# Patient Record
Sex: FEMALE | Race: WHITE | NOT HISPANIC OR LATINO | URBAN - METROPOLITAN AREA
[De-identification: names, ages, dates, MRNs, and addresses within clinical notes are randomized per-mention and may not be internally consistent; named-entity substitution may affect disease eponyms.]

---

## 2018-01-06 ENCOUNTER — INPATIENT (INPATIENT)
Facility: HOSPITAL | Age: 58
LOS: 18 days | Discharge: HOME | End: 2018-01-25
Attending: PSYCHIATRY & NEUROLOGY | Admitting: FAMILY MEDICINE

## 2018-01-06 VITALS
HEART RATE: 63 BPM | RESPIRATION RATE: 16 BRPM | HEIGHT: 60 IN | OXYGEN SATURATION: 100 % | WEIGHT: 104.94 LBS | TEMPERATURE: 98 F

## 2018-01-06 RX ORDER — ACETAMINOPHEN 500 MG
650 TABLET ORAL EVERY 6 HOURS
Qty: 0 | Refills: 0 | Status: DISCONTINUED | OUTPATIENT
Start: 2018-01-06 | End: 2018-01-25

## 2018-01-06 RX ORDER — RISPERIDONE 4 MG/1
2 TABLET ORAL DAILY
Qty: 0 | Refills: 0 | Status: DISCONTINUED | OUTPATIENT
Start: 2018-01-06 | End: 2018-01-09

## 2018-01-06 RX ORDER — MAGNESIUM HYDROXIDE 400 MG/1
30 TABLET, CHEWABLE ORAL DAILY
Qty: 0 | Refills: 0 | Status: DISCONTINUED | OUTPATIENT
Start: 2018-01-06 | End: 2018-01-25
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
## 2018-01-07 DIAGNOSIS — F31.9 BIPOLAR DISORDER, UNSPECIFIED: ICD-10-CM

## 2018-01-07 DIAGNOSIS — Z91.14 PATIENT'S OTHER NONCOMPLIANCE WITH MEDICATION REGIMEN: ICD-10-CM

## 2018-01-07 RX ORDER — BENZTROPINE MESYLATE 1 MG
1 TABLET ORAL
Qty: 0 | Refills: 0 | Status: DISCONTINUED | OUTPATIENT
Start: 2018-01-07 | End: 2018-01-09

## 2018-01-07 RX ADMIN — RISPERIDONE 2 MILLIGRAM(S): 4 TABLET ORAL at 08:59

## 2018-01-07 RX ADMIN — Medication 1 MILLIGRAM(S): at 21:26

## 2018-01-07 NOTE — BEHAVIORAL HEALTH ASSESSMENT NOTE - NSBHCHARTREVIEWLAB_PSY_A_CORE FT
na 89        u tox is neg  k 4.4  bun9  cr 0.56  alt 22  ast 23  tsh 2.7  wbc 6.92   hem13.6  crit 41.2

## 2018-01-07 NOTE — BEHAVIORAL HEALTH ASSESSMENT NOTE - NSBHCHARTREVIEWVS_PSY_A_CORE FT
Vital Signs Last 24 Hrs  T(C): 36.7 (07 Jan 2018 08:48), Max: 36.7 (07 Jan 2018 08:48)  T(F): 98 (07 Jan 2018 08:48), Max: 98 (07 Jan 2018 08:48)  HR: 74 (07 Jan 2018 08:48) (63 - 74)  BP: 103/73 (07 Jan 2018 08:48) (103/73 - 103/73)  BP(mean): --  RR: 12 (07 Jan 2018 08:48) (12 - 16)  SpO2: 100% (07 Jan 2018 08:48) (100% - 100%)
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
## 2018-01-07 NOTE — BEHAVIORAL HEALTH ASSESSMENT NOTE - RISK ASSESSMENT
1. chronic mental illness, psychosis  2. suicide thoughts and attempt  3. noncompliance with medication and treatment
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
## 2018-01-07 NOTE — H&P ADULT - ASSESSMENT
57F.  admitted 01/06/18.  attempted to hang herself.  nonadherent w/ psychiatric medications hx of BPD.    schizoaffective disorder, Bipolar disorder.   -suicide thoughts and attempt.  -nonadherent w/ psychiatric medication.  -c/w Psychiatric stabilization and management as per primary care team.    health maintenance  -suggested she establish w/ a community physician.  -advised follow up w/ routine age appropriate USPSTF screening recommendations and immunizations.
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
## 2018-01-07 NOTE — H&P ADULT - REASON FOR ADMISSION
Patient is a 57y old  Female who presents with a chief complaint of "I'm just trying to blend in here"

## 2018-01-07 NOTE — BEHAVIORAL HEALTH ASSESSMENT NOTE - OTHER PAST PSYCHIATRIC HISTORY (INCLUDE DETAILS REGARDING ONSET, COURSE OF ILLNESS, INPATIENT/OUTPATIENT TREATMENT)
two prior psych hospitalization at [facility] : 9/2017, 10/20/2017 .    According to the  pt has been nioncompliant with follow up since d/c from the 10/2017 hospitalization two prior psych hospitalization at [facility] : 9/2017, 10/20/2017 .    According to the  pt has been non-compliant with follow up since d/c from the 10/2017 hospitalization according to the  but pt denies this and claims follow up with Dr Ramiro Schumacher and Dr Aguero  Pt not willing to indicate reason for the hospitalization "It's nothing why , what does that have to do with now?"

## 2018-01-07 NOTE — BEHAVIORAL HEALTH ASSESSMENT NOTE - NSBHADMITIPSTRENGTH_PSY_A_CORE
Cooperative with treatment/Has supportive interpersonal relationships with family, friends or peers/Has access to housing/residential stability

## 2018-01-07 NOTE — H&P ADULT - NSHPPHYSICALEXAM_GEN_ALL_CORE
Vital Signs Last 24 Hrs  T(C): 36.7 (07 Jan 2018 08:48), Max: 36.7 (07 Jan 2018 08:48)  T(F): 98 (07 Jan 2018 08:48), Max: 98 (07 Jan 2018 08:48)  HR: 74 (07 Jan 2018 08:48) (63 - 74)  BP: 103/73 (07 Jan 2018 08:48) (103/73 - 103/73)  BP(mean): --  RR: 12 (07 Jan 2018 08:48) (12 - 16)  SpO2: 100% (07 Jan 2018 08:48) (100% - 100%)    PHYSICAL EXAM:  Constitutional: NAD, awake and alert, well-developed with good responses to questions, mentally intact.   HEENT: PERRL, EOMI, MMM.  Neck: Soft and supple, No carotid bruit, No JVD  Respiratory: Breath sounds are clear bilaterally, No wheezing, rales or rhonchi  Cardiovascular: S1 and S2, regular rate and rhythm, no murmur, rub or gallop.  Gastrointestinal: Bowel Sounds present, soft, nontender, nondistended, no guarding, no rebound, no mass.  Extremities: No peripheral edema  Vascular: 2+ peripheral pulses  Neurological: A/O x 3, no focal deficits  Musculoskeletal: 5/5 strength b/l upper and lower extremities  Skin:  no visible rashes.

## 2018-01-08 RX ADMIN — Medication 1 MILLIGRAM(S): at 21:19

## 2018-01-08 RX ADMIN — Medication 1 MILLIGRAM(S): at 08:53

## 2018-01-08 RX ADMIN — RISPERIDONE 2 MILLIGRAM(S): 4 TABLET ORAL at 08:53

## 2018-01-08 NOTE — PROGRESS NOTE BEHAVIORAL HEALTH - NSBHFUPINTERVALHXFT_PSY_A_CORE
Left call back for the therapist and psychiatrist  Pt allowing contact . Left call back for the therapist and psychiatrist  Pt allowing contact . Pt with continued lack of insight Pt remains superficial ly cooperative but still maintains that she is here only because her  is not well and made up a story for her to be here.

## 2018-01-09 RX ORDER — RISPERIDONE 4 MG/1
3 TABLET ORAL DAILY
Qty: 0 | Refills: 0 | Status: DISCONTINUED | OUTPATIENT
Start: 2018-01-10 | End: 2018-01-18

## 2018-01-09 RX ORDER — RISPERIDONE 4 MG/1
1 TABLET ORAL DAILY
Qty: 0 | Refills: 0 | Status: DISCONTINUED | OUTPATIENT
Start: 2018-01-09 | End: 2018-01-09

## 2018-01-09 RX ORDER — RISPERIDONE 4 MG/1
1 TABLET ORAL ONCE
Qty: 0 | Refills: 0 | Status: COMPLETED | OUTPATIENT
Start: 2018-01-09 | End: 2018-01-09

## 2018-01-09 RX ORDER — BENZTROPINE MESYLATE 1 MG
2 TABLET ORAL
Qty: 0 | Refills: 0 | Status: DISCONTINUED | OUTPATIENT
Start: 2018-01-09 | End: 2018-01-11

## 2018-01-09 RX ADMIN — RISPERIDONE 2 MILLIGRAM(S): 4 TABLET ORAL at 09:17

## 2018-01-09 RX ADMIN — RISPERIDONE 1 MILLIGRAM(S): 4 TABLET ORAL at 10:51

## 2018-01-09 RX ADMIN — Medication 2 MILLIGRAM(S): at 21:06

## 2018-01-09 RX ADMIN — Medication 1 MILLIGRAM(S): at 09:17

## 2018-01-09 NOTE — PROGRESS NOTE BEHAVIORAL HEALTH - NSBHFUPINTERVALHXFT_PSY_A_CORE
Pt continuing with loosening of association and tangential speech Pt is restless and with no insight.  Still she is insisting that her  made up the story of her attempting to hang herself.  Pt with impaired reasoning and is not aware that some of her own thoughts are illogical.

## 2018-01-10 RX ADMIN — Medication 2 MILLIGRAM(S): at 20:44

## 2018-01-10 RX ADMIN — Medication 2 MILLIGRAM(S): at 08:54

## 2018-01-10 RX ADMIN — RISPERIDONE 3 MILLIGRAM(S): 4 TABLET ORAL at 08:54
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
## 2018-01-10 NOTE — PROGRESS NOTE ADULT - ASSESSMENT
Pt with thick nails. States seen DPM had culture "No Fungus" generalized xerosis the soles and fissure to post R heel. Advise Lac Hydrin 12 % Q day, prn feet & Toe nails. Should F/U with PDPM as OP THANKS

## 2018-01-10 NOTE — DIETITIAN INITIAL EVALUATION ADULT. - OTHER INFO
Consult for assessment and education. Pt denies chewing and swallowing difficulty. Pt denies n/v/d/c. Pt reports eating well, likes vegetables and focused on healthy foods. Pt states she dose not like to eat meat and enjoys vegetarian dishes. Pt states she runs half marathons and enjoys dance. Pt seen on bike and at first did not want to talk to RD. Recommend monitoring pt's weight to assess for weight loss. Pt seemed concerned about gaining weight and may have unhealthy obsession with weight and exercise (will monitor).

## 2018-01-11 RX ORDER — BENZTROPINE MESYLATE 1 MG
1 TABLET ORAL THREE TIMES A DAY
Qty: 0 | Refills: 0 | Status: DISCONTINUED | OUTPATIENT
Start: 2018-01-11 | End: 2018-01-25

## 2018-01-11 RX ADMIN — Medication 1 MILLIGRAM(S): at 14:05

## 2018-01-11 RX ADMIN — Medication 2 MILLIGRAM(S): at 09:20

## 2018-01-11 RX ADMIN — RISPERIDONE 3 MILLIGRAM(S): 4 TABLET ORAL at 09:20

## 2018-01-11 NOTE — PROGRESS NOTE BEHAVIORAL HEALTH - NSBHFUPINTERVALHXFT_PSY_A_CORE
Pt continues with wanting to remain in her room.  Pt with continued paranoid delusions Pt with compliance with medication but is also with the cc of getting sedated.Pt remains preoccupied.

## 2018-01-11 NOTE — PROGRESS NOTE BEHAVIORAL HEALTH - NSBHCHARTREVIEWINVESTIGATE_PSY_A_CORE FT
QRS axis to [] ° and NSR at a rate of [] BPM. There was no atrial enlargement. There was no ventricular hypertrophy. There were no ST-T changes and all intervals were normal.
QRS axis to [] ° and NSR at a rate of [] BPM. There was no atrial enlargement. There was no ventricular hypertrophy. There were no ST-T changes and all intervals were normal.

## 2018-01-11 NOTE — PROGRESS NOTE BEHAVIORAL HEALTH - NSBHFUPINTERVALHXFT_PSY_A_CORE
Will decrease the cogentin as the pt with some side effect s and with no cc of any muscle tightness or any signs of EPS.      Pt still with the thought that her  gave a false history of her being suicidal .  However the pt is not asking to leave the  hospital at this time.  Pt with less tangential speech , still inclusive.  She denies any suicidal ideation or plan

## 2018-01-12 RX ADMIN — Medication 1 MILLIGRAM(S): at 09:10

## 2018-01-12 RX ADMIN — Medication 1 MILLIGRAM(S): at 21:02

## 2018-01-12 RX ADMIN — Medication 1 MILLIGRAM(S): at 12:43

## 2018-01-12 RX ADMIN — RISPERIDONE 3 MILLIGRAM(S): 4 TABLET ORAL at 09:11

## 2018-01-12 NOTE — CONSULT NOTE ADULT - ASSESSMENT
*Schizoaffective disorder/ Bipolar disorder.   -Management as per Psych  -suicide thoughts and attempt.  -nonadherent w/ psychiatric medication.  -c/w Psychiatric stabilization and management as per primary care team. *Schizoaffective disorder/ Bipolar disorder.   -Management as per Psych  -suicide thoughts and attempt.  -nonadherent w/ psychiatric medication.  -c/w Psychiatric stabilization and management as per primary care team.    *Ingrown Hair/Mild Folliculitis of right groin  -Does not look infected  -Warm compressors  -Keep area clean  -Avoid Hot tubs and Shaving the Area  -monitor for Fevers and worsening redness    Thank you for this courtesy consult and Reconsult if needed. signing off case.

## 2018-01-12 NOTE — PROGRESS NOTE BEHAVIORAL HEALTH - NSBHFUPINTERVALHXFT_PSY_A_CORE
Pt with goods eye contact Alert Pt with denial of any suicide ideation  Spoke with the  who will be visiting the pt this weekend.  Pt with decreased paranoid thoughts and is more interactive with peers
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
## 2018-01-13 RX ADMIN — Medication 1 MILLIGRAM(S): at 21:04

## 2018-01-13 RX ADMIN — Medication 1 MILLIGRAM(S): at 12:47

## 2018-01-13 RX ADMIN — Medication 1 MILLIGRAM(S): at 09:06

## 2018-01-13 RX ADMIN — RISPERIDONE 3 MILLIGRAM(S): 4 TABLET ORAL at 09:06

## 2018-01-14 RX ADMIN — RISPERIDONE 3 MILLIGRAM(S): 4 TABLET ORAL at 09:05

## 2018-01-14 RX ADMIN — Medication 1 MILLIGRAM(S): at 20:46

## 2018-01-14 RX ADMIN — Medication 1 MILLIGRAM(S): at 13:12

## 2018-01-14 RX ADMIN — MAGNESIUM HYDROXIDE 30 MILLILITER(S): 400 TABLET, CHEWABLE ORAL at 19:13

## 2018-01-14 RX ADMIN — Medication 1 MILLIGRAM(S): at 09:05

## 2018-01-15 RX ADMIN — Medication 1 MILLIGRAM(S): at 13:00

## 2018-01-15 RX ADMIN — Medication 1 MILLIGRAM(S): at 20:23

## 2018-01-15 RX ADMIN — Medication 1 MILLIGRAM(S): at 09:20

## 2018-01-15 RX ADMIN — MAGNESIUM HYDROXIDE 30 MILLILITER(S): 400 TABLET, CHEWABLE ORAL at 19:40

## 2018-01-15 RX ADMIN — RISPERIDONE 3 MILLIGRAM(S): 4 TABLET ORAL at 09:20

## 2018-01-16 RX ADMIN — Medication 1 MILLIGRAM(S): at 21:09

## 2018-01-16 RX ADMIN — MAGNESIUM HYDROXIDE 30 MILLILITER(S): 400 TABLET, CHEWABLE ORAL at 21:09

## 2018-01-16 RX ADMIN — RISPERIDONE 3 MILLIGRAM(S): 4 TABLET ORAL at 09:30

## 2018-01-16 RX ADMIN — Medication 1 MILLIGRAM(S): at 12:39

## 2018-01-16 RX ADMIN — Medication 1 MILLIGRAM(S): at 09:30

## 2018-01-16 NOTE — PROGRESS NOTE BEHAVIORAL HEALTH - NSBHFUPINTERVALHXFT_PSY_A_CORE
Pt with less pressured speech, still with some suspicious guardedness, and remains superficially cooperative. Pt often will verbalize that she has a mental disorder but usually will say this as she believes this is the expected response. She remains in behavioral control. Spoke with the pt who claim

## 2018-01-16 NOTE — PROGRESS NOTE BEHAVIORAL HEALTH - NSBHFUPINTERVALHXFT_PSY_A_CORE
Pt still maintaining that the  is lying about some of the history. She still claims that she has no compliance problem especially on the outside yet her brother is aware of this and so is the  and the pt came in with no active medication being taken and was fully manic with pressured speech, grandiose and paranoid delusions and poor sleep.  Spoke with the pt about injectable medication and she indicated that she had been on the risperdal consta in the past.  Pt only willing to be on 25mg IM of the consta,  Spoke with the CSW will need family meeting , mostly likely with the pt,  and the father.  Pt continued with the denial of any suicidal ideation or plan

## 2018-01-16 NOTE — PROGRESS NOTE BEHAVIORAL HEALTH - PROBLEM SELECTOR PLAN 1
risperdal 2mg po daily
risperdal for mood and affect stability

## 2018-01-16 NOTE — PROGRESS NOTE BEHAVIORAL HEALTH - PROBLEM SELECTOR PROBLEM 2
Noncompliance with medication regimen
Suicide attempt by self-inflicted strangulation or suffocation

## 2018-01-17 RX ORDER — RISPERIDONE 4 MG/1
25 TABLET ORAL EVERY 2 WEEKS
Qty: 0 | Refills: 0 | Status: DISCONTINUED | OUTPATIENT
Start: 2018-01-17 | End: 2018-01-18

## 2018-01-17 RX ADMIN — RISPERIDONE 25 MILLIGRAM(S): 4 TABLET ORAL at 21:26

## 2018-01-17 RX ADMIN — Medication 1 MILLIGRAM(S): at 21:02

## 2018-01-17 RX ADMIN — Medication 1 MILLIGRAM(S): at 12:28

## 2018-01-17 RX ADMIN — Medication 1 MILLIGRAM(S): at 09:25

## 2018-01-17 RX ADMIN — RISPERIDONE 3 MILLIGRAM(S): 4 TABLET ORAL at 09:25

## 2018-01-17 RX ADMIN — MAGNESIUM HYDROXIDE 30 MILLILITER(S): 400 TABLET, CHEWABLE ORAL at 21:02

## 2018-01-17 NOTE — PROGRESS NOTE BEHAVIORAL HEALTH - NSBHFUPINTERVALHXFT_PSY_A_CORE
Pt denies any suicidal ideation or plan Pt with denial of any side effects from medication.  She has a history of noncompliance with medication and with that would have increased manic symptoms of pressured speech, paranoid and manic delusions.  Currently the pt is still claiming that the  has been lying about her and is tell other people that she is "the ill person "    Will be needing a family meeting.

## 2018-01-18 RX ORDER — RISPERIDONE 4 MG/1
37.5 TABLET ORAL
Qty: 0 | Refills: 0 | Status: DISCONTINUED | OUTPATIENT
Start: 2018-01-18 | End: 2018-01-19

## 2018-01-18 RX ORDER — RISPERIDONE 4 MG/1
4 TABLET ORAL DAILY
Qty: 0 | Refills: 0 | Status: DISCONTINUED | OUTPATIENT
Start: 2018-01-18 | End: 2018-01-25

## 2018-01-18 RX ADMIN — Medication 1 MILLIGRAM(S): at 13:05

## 2018-01-18 RX ADMIN — Medication 1 MILLIGRAM(S): at 20:31

## 2018-01-18 RX ADMIN — RISPERIDONE 3 MILLIGRAM(S): 4 TABLET ORAL at 09:01

## 2018-01-18 RX ADMIN — MAGNESIUM HYDROXIDE 30 MILLILITER(S): 400 TABLET, CHEWABLE ORAL at 20:41

## 2018-01-18 RX ADMIN — Medication 1 MILLIGRAM(S): at 09:01

## 2018-01-18 NOTE — PROGRESS NOTE BEHAVIORAL HEALTH - NSBHFUPINTERVALHXFT_PSY_A_CORE
Although the pt is less pressured in speech and is with less grandiose delusion, she is still with intense labile inappropriate affect.  Still with paranoid delusions and is accusatory  . Will readjust the risperdal as this is the only medication that she is allowing at this time

## 2018-01-19 RX ORDER — LITHIUM CARBONATE 300 MG/1
600 TABLET, EXTENDED RELEASE ORAL AT BEDTIME
Qty: 0 | Refills: 0 | Status: DISCONTINUED | OUTPATIENT
Start: 2018-01-19 | End: 2018-01-22

## 2018-01-19 RX ADMIN — Medication 1 MILLIGRAM(S): at 20:27

## 2018-01-19 RX ADMIN — Medication 1 MILLIGRAM(S): at 08:59

## 2018-01-19 RX ADMIN — MAGNESIUM HYDROXIDE 30 MILLILITER(S): 400 TABLET, CHEWABLE ORAL at 20:28

## 2018-01-19 RX ADMIN — Medication 1 MILLIGRAM(S): at 13:41

## 2018-01-19 RX ADMIN — RISPERIDONE 4 MILLIGRAM(S): 4 TABLET ORAL at 08:59

## 2018-01-19 NOTE — PROGRESS NOTE BEHAVIORAL HEALTH - NSBHADMITCOUNSEL_PSY_A_CORE
risk factor reduction/client/family/caregiver education/risks and benefits of treatment options
client/family/caregiver education/risks and benefits of treatment options/risk factor reduction

## 2018-01-20 LAB
ALBUMIN SERPL ELPH-MCNC: 4 G/DL — SIGNIFICANT CHANGE UP (ref 3.3–5)
ALP SERPL-CCNC: 110 U/L — SIGNIFICANT CHANGE UP (ref 40–120)
ALT FLD-CCNC: 22 U/L — SIGNIFICANT CHANGE UP (ref 12–78)
ANION GAP SERPL CALC-SCNC: 6 MMOL/L — SIGNIFICANT CHANGE UP (ref 5–17)
AST SERPL-CCNC: 12 U/L — LOW (ref 15–37)
BILIRUB SERPL-MCNC: 0.6 MG/DL — SIGNIFICANT CHANGE UP (ref 0.2–1.2)
BUN SERPL-MCNC: 7 MG/DL — SIGNIFICANT CHANGE UP (ref 7–23)
CALCIUM SERPL-MCNC: 9 MG/DL — SIGNIFICANT CHANGE UP (ref 8.5–10.1)
CHLORIDE SERPL-SCNC: 100 MMOL/L — SIGNIFICANT CHANGE UP (ref 96–108)
CO2 SERPL-SCNC: 30 MMOL/L — SIGNIFICANT CHANGE UP (ref 22–31)
CREAT SERPL-MCNC: 0.62 MG/DL — SIGNIFICANT CHANGE UP (ref 0.5–1.3)
GLUCOSE SERPL-MCNC: 93 MG/DL — SIGNIFICANT CHANGE UP (ref 70–99)
HCT VFR BLD CALC: 45.2 % — HIGH (ref 34.5–45)
HGB BLD-MCNC: 14.7 G/DL — SIGNIFICANT CHANGE UP (ref 11.5–15.5)
MCHC RBC-ENTMCNC: 31 PG — SIGNIFICANT CHANGE UP (ref 27–34)
MCHC RBC-ENTMCNC: 32.6 GM/DL — SIGNIFICANT CHANGE UP (ref 32–36)
MCV RBC AUTO: 95.1 FL — SIGNIFICANT CHANGE UP (ref 80–100)
PLATELET # BLD AUTO: 271 K/UL — SIGNIFICANT CHANGE UP (ref 150–400)
POTASSIUM SERPL-MCNC: 3.8 MMOL/L — SIGNIFICANT CHANGE UP (ref 3.5–5.3)
POTASSIUM SERPL-SCNC: 3.8 MMOL/L — SIGNIFICANT CHANGE UP (ref 3.5–5.3)
PROT SERPL-MCNC: 7.4 GM/DL — SIGNIFICANT CHANGE UP (ref 6–8.3)
RBC # BLD: 4.75 M/UL — SIGNIFICANT CHANGE UP (ref 3.8–5.2)
RBC # FLD: 10.9 % — SIGNIFICANT CHANGE UP (ref 10.3–14.5)
SODIUM SERPL-SCNC: 136 MMOL/L — SIGNIFICANT CHANGE UP (ref 135–145)
TSH SERPL-MCNC: 1.35 UU/ML — SIGNIFICANT CHANGE UP (ref 0.36–3.74)
WBC # BLD: 4.5 K/UL — SIGNIFICANT CHANGE UP (ref 3.8–10.5)
WBC # FLD AUTO: 4.5 K/UL — SIGNIFICANT CHANGE UP (ref 3.8–10.5)

## 2018-01-20 RX ADMIN — Medication 1 MILLIGRAM(S): at 12:03

## 2018-01-20 RX ADMIN — Medication 1 MILLIGRAM(S): at 08:55

## 2018-01-20 RX ADMIN — Medication 1 MILLIGRAM(S): at 21:00

## 2018-01-20 RX ADMIN — RISPERIDONE 4 MILLIGRAM(S): 4 TABLET ORAL at 08:55

## 2018-01-21 RX ADMIN — Medication 1 MILLIGRAM(S): at 13:09

## 2018-01-21 RX ADMIN — Medication 1 MILLIGRAM(S): at 21:04

## 2018-01-21 RX ADMIN — MAGNESIUM HYDROXIDE 30 MILLILITER(S): 400 TABLET, CHEWABLE ORAL at 21:06

## 2018-01-21 RX ADMIN — Medication 1 MILLIGRAM(S): at 08:59

## 2018-01-21 RX ADMIN — RISPERIDONE 4 MILLIGRAM(S): 4 TABLET ORAL at 08:59

## 2018-01-22 RX ORDER — RISPERIDONE 4 MG/1
1 TABLET ORAL AT BEDTIME
Qty: 0 | Refills: 0 | Status: DISCONTINUED | OUTPATIENT
Start: 2018-01-22 | End: 2018-01-25

## 2018-01-22 RX ADMIN — RISPERIDONE 1 MILLIGRAM(S): 4 TABLET ORAL at 21:13

## 2018-01-22 RX ADMIN — RISPERIDONE 4 MILLIGRAM(S): 4 TABLET ORAL at 08:58

## 2018-01-22 RX ADMIN — Medication 1 MILLIGRAM(S): at 08:58

## 2018-01-22 RX ADMIN — Medication 1 MILLIGRAM(S): at 21:11

## 2018-01-22 RX ADMIN — Medication 1 MILLIGRAM(S): at 13:49

## 2018-01-22 RX ADMIN — MAGNESIUM HYDROXIDE 30 MILLILITER(S): 400 TABLET, CHEWABLE ORAL at 22:13

## 2018-01-22 NOTE — PROGRESS NOTE BEHAVIORAL HEALTH - NSBHFUPINTERVALHXFT_PSY_A_CORE
Pt with continued hypomania with pressured speech, tangential and loosening of association.  Pt with refusal of lithium, tegretal  topamax, trileptal .  Pt claimed"I am doing fine. " Pt with little insight and now claims "its a mistake to have all those people in one room.  Pt claiming that she felt overwhelmed by all those  people.

## 2018-01-23 RX ADMIN — Medication 1 MILLIGRAM(S): at 08:59

## 2018-01-23 RX ADMIN — Medication 1 MILLIGRAM(S): at 13:04

## 2018-01-23 RX ADMIN — RISPERIDONE 4 MILLIGRAM(S): 4 TABLET ORAL at 08:59

## 2018-01-23 RX ADMIN — RISPERIDONE 1 MILLIGRAM(S): 4 TABLET ORAL at 20:26

## 2018-01-23 RX ADMIN — Medication 1 MILLIGRAM(S): at 20:26

## 2018-01-23 RX ADMIN — MAGNESIUM HYDROXIDE 30 MILLILITER(S): 400 TABLET, CHEWABLE ORAL at 20:30

## 2018-01-23 NOTE — PROGRESS NOTE BEHAVIORAL HEALTH - NSBHFUPINTERVALHXFT_PSY_A_CORE
Pt explained away her hypomania and pressured speech and restlessness  as " I'm a happy go kathy what can I say."   Pt with limited insight, and was busy "power walking " in the hallway with a broad smile. Pt also minimized her exaggerated response to her family pointing out her symptoms as " I didn't have good enough sleep the night before, and there were too many of them (family members ) and only one of me."  Pt had an illogical explanation to every attempt to give her feedback  about symptoms and her behavior. Pt externalizing all issues to other people "failing to do their jobs, not telling me what to expect and taking advantage of me "    Pt is consistently denying any suicidal ideation or plan and spoke of her future plans to " getting back to my psychotherapy and be with my real true friends."

## 2018-01-24 RX ADMIN — Medication 1 MILLIGRAM(S): at 12:54

## 2018-01-24 RX ADMIN — RISPERIDONE 4 MILLIGRAM(S): 4 TABLET ORAL at 08:49

## 2018-01-24 RX ADMIN — RISPERIDONE 1 MILLIGRAM(S): 4 TABLET ORAL at 20:50

## 2018-01-24 RX ADMIN — Medication 1 MILLIGRAM(S): at 08:49

## 2018-01-24 RX ADMIN — Medication 1 MILLIGRAM(S): at 20:50

## 2018-01-24 NOTE — PROGRESS NOTE BEHAVIORAL HEALTH - AFFECT QUALITY
Anxious/Elevated
Anxious/Elevated
Elevated/Anxious
Anxious/Elevated
Elevated/Anxious
Anxious/Elevated
Elevated/Anxious

## 2018-01-24 NOTE — PROGRESS NOTE BEHAVIORAL HEALTH - SECONDARY DX1
Suicide attempt by self-inflicted strangulation or suffocation
Noncompliance with medication regimen
Suicide attempt by self-inflicted strangulation or suffocation
Noncompliance with medication regimen
Suicide attempt by self-inflicted strangulation or suffocation
Noncompliance with medication regimen
Noncompliance with medication regimen

## 2018-01-24 NOTE — PROGRESS NOTE BEHAVIORAL HEALTH - LANGUAGE
No abnormalities noted

## 2018-01-24 NOTE — PROGRESS NOTE BEHAVIORAL HEALTH - ABNORMAL MOVEMENTS
No abnormal movements

## 2018-01-24 NOTE — PROGRESS NOTE BEHAVIORAL HEALTH - NSBHFUPINTERVALHXFT_PSY_A_CORE
Pt with little insight or motivation. Pt remains in behavorial control but  still has pressuring of speech and thought flooding. Pt continue to dismiss any attempt to gain insight .  Spoke with her concerning the risperdal consta but the pt is ambivalent about getting an increase in the dose. She continues to be resistive to getting a mood stabilizer "no I don't need to be controlled."

## 2018-01-24 NOTE — PROGRESS NOTE BEHAVIORAL HEALTH - IMPULSE CONTROL
Impaired

## 2018-01-24 NOTE — PROGRESS NOTE BEHAVIORAL HEALTH - MOOD
Anxious

## 2018-01-24 NOTE — PROGRESS NOTE BEHAVIORAL HEALTH - NSBHFUPVIOL_PSY_A_CORE
none known
yes
none known

## 2018-01-24 NOTE — PROGRESS NOTE BEHAVIORAL HEALTH - THOUGHT PROCESS
Impaired reasoning/Overinclusive/Circumstantial/Illogical
Illogical/Impaired reasoning/Overinclusive/Circumstantial
Overinclusive/Circumstantial/Illogical/Impaired reasoning
Overinclusive/Circumstantial/Tangential/Flight of ideas/Illogical/Impaired reasoning
Overinclusive/Circumstantial/Illogical/Impaired reasoning
Flight of ideas/Illogical/Impaired reasoning/Overinclusive/Tangential/Circumstantial
Illogical/Impaired reasoning/Overinclusive/Circumstantial
Impaired reasoning/Overinclusive/Circumstantial/Illogical
Overinclusive/Circumstantial/Illogical/Impaired reasoning
Overinclusive/Illogical/Impaired reasoning/Circumstantial
Circumstantial/Overinclusive/Illogical/Impaired reasoning
Overinclusive/Impaired reasoning/Circumstantial/Tangential/Flight of ideas/Illogical

## 2018-01-24 NOTE — PROGRESS NOTE BEHAVIORAL HEALTH - NSBHADMITIPREASON_PSY_A_CORE
Danger to self; mental illness expected to respond to inpatient care

## 2018-01-24 NOTE — PROGRESS NOTE BEHAVIORAL HEALTH - MUSCLE TONE / STRENGTH
Normal muscle tone/strength

## 2018-01-24 NOTE — PROGRESS NOTE BEHAVIORAL HEALTH - NSBHADMITDANGERSELF_PSY_A_CORE
suicidal ideation with plan and means
unable to care for self
suicidal ideation with plan and means
suicidal ideation with plan and means
unable to care for self
suicidal ideation with plan and means
unable to care for self

## 2018-01-24 NOTE — PROGRESS NOTE BEHAVIORAL HEALTH - NSBHADMITMEDEDUDETAILS_A_CORE FT
pt is aware of the use of medication and of the potential side effects
pt is aware of the use of medication and of the potential side effect
pt is aware of the use of medication and of the potential side effects
pt is aware of the use of medication and of the potential side effects
pt aware of the use of medications
pt is aware of the use of medication and of the potential side effects
pt aware of the use of medications
pt is aware of the use of medication and of the potential side effects
pt is aware of the use of medication and of the potential side effect
pt is aware of the use of medication and of the potential side effect
pt aware of the use of medications

## 2018-01-24 NOTE — PROGRESS NOTE BEHAVIORAL HEALTH - BODY HABITUS
Well nourished

## 2018-01-24 NOTE — PROGRESS NOTE BEHAVIORAL HEALTH - NSBHCHARTREVIEWVS_PSY_A_CORE FT
Vital Signs Last 24 Hrs  T(C): 36.9 (12 Jan 2018 08:59), Max: 36.9 (12 Jan 2018 08:59)  T(F): 98.5 (12 Jan 2018 08:59), Max: 98.5 (12 Jan 2018 08:59)  HR: 71 (12 Jan 2018 08:59) (71 - 71)  BP: 98/55 (12 Jan 2018 08:59) (98/55 - 98/55)  BP(mean): --  RR: 12 (12 Jan 2018 08:59) (12 - 12)  SpO2: 99% (12 Jan 2018 08:59) (99% - 99%)
Vital Signs Last 24 Hrs  T(C): 36.7 (07 Jan 2018 08:48), Max: 36.7 (07 Jan 2018 08:48)  T(F): 98 (07 Jan 2018 08:48), Max: 98 (07 Jan 2018 08:48)  HR: 74 (07 Jan 2018 08:48) (63 - 74)  BP: 103/73 (07 Jan 2018 08:48) (103/73 - 103/73)  BP(mean): --  RR: 12 (07 Jan 2018 08:48) (12 - 16)  SpO2: 100% (07 Jan 2018 08:48) (100% - 100%)
Vital Signs Last 24 Hrs  T(C): 36.6 (11 Jan 2018 08:07), Max: 36.6 (11 Jan 2018 08:07)  T(F): 97.9 (11 Jan 2018 08:07), Max: 97.9 (11 Jan 2018 08:07)  HR: 64 (11 Jan 2018 08:07) (64 - 64)  BP: 96/59 (11 Jan 2018 08:07) (96/59 - 96/59)  BP(mean): --  RR: 16 (11 Jan 2018 08:07) (16 - 16)  SpO2: 100% (11 Jan 2018 08:07) (100% - 100%)
Vital Signs Last 24 Hrs  T(C): 36.9 (23 Jan 2018 08:25), Max: 36.9 (23 Jan 2018 08:25)  T(F): 98.5 (23 Jan 2018 08:25), Max: 98.5 (23 Jan 2018 08:25)  HR: 62 (23 Jan 2018 08:25) (62 - 62)  BP: 112/65 (23 Jan 2018 08:25) (112/65 - 112/65)  BP(mean): --  RR: 14 (23 Jan 2018 08:25) (14 - 14)  SpO2: 98% (23 Jan 2018 08:25) (98% - 98%)
Vital Signs Last 24 Hrs  T(C): 36.9 (12 Jan 2018 08:59), Max: 36.9 (12 Jan 2018 08:59)  T(F): 98.5 (12 Jan 2018 08:59), Max: 98.5 (12 Jan 2018 08:59)  HR: 71 (12 Jan 2018 08:59) (71 - 71)  BP: 98/55 (12 Jan 2018 08:59) (98/55 - 98/55)  BP(mean): --  RR: 12 (12 Jan 2018 08:59) (12 - 12)  SpO2: 99% (12 Jan 2018 08:59) (99% - 99%)
Vital Signs Last 24 Hrs  T(C): 36.9 (23 Jan 2018 08:25), Max: 36.9 (23 Jan 2018 08:25)  T(F): 98.5 (23 Jan 2018 08:25), Max: 98.5 (23 Jan 2018 08:25)  HR: 62 (23 Jan 2018 08:25) (62 - 62)  BP: 112/65 (23 Jan 2018 08:25) (112/65 - 112/65)  BP(mean): --  RR: 14 (23 Jan 2018 08:25) (14 - 14)  SpO2: 98% (23 Jan 2018 08:25) (98% - 98%)
Vital Signs Last 24 Hrs  T(C): 36.7 (07 Jan 2018 08:48), Max: 36.7 (07 Jan 2018 08:48)  T(F): 98 (07 Jan 2018 08:48), Max: 98 (07 Jan 2018 08:48)  HR: 74 (07 Jan 2018 08:48) (63 - 74)  BP: 103/73 (07 Jan 2018 08:48) (103/73 - 103/73)  BP(mean): --  RR: 12 (07 Jan 2018 08:48) (12 - 16)  SpO2: 100% (07 Jan 2018 08:48) (100% - 100%)
Vital Signs Last 24 Hrs  T(C): 36.7 (07 Jan 2018 08:48), Max: 36.7 (07 Jan 2018 08:48)  T(F): 98 (07 Jan 2018 08:48), Max: 98 (07 Jan 2018 08:48)  HR: 74 (07 Jan 2018 08:48) (63 - 74)  BP: 103/73 (07 Jan 2018 08:48) (103/73 - 103/73)  BP(mean): --  RR: 12 (07 Jan 2018 08:48) (12 - 16)  SpO2: 100% (07 Jan 2018 08:48) (100% - 100%)

## 2018-01-24 NOTE — PROGRESS NOTE BEHAVIORAL HEALTH - NSBHPTASSESSDT_PSY_A_CORE
08-Jan-2018 15:57
08-Jan-2018 16:43
10-Prateek-2018 19:32
11-Jan-2018 12:55
17-Jan-2018 17:31
18-Jan-2018 17:38
22-Jan-2018 16:34
24-Jan-2018 10:07
16-Jan-2018 15:42
23-Jan-2018 15:08
16-Jan-2018 15:17
09-Jan-2018 15:18
12-Jan-2018 19:45
19-Jan-2018 18:46

## 2018-01-24 NOTE — PROGRESS NOTE BEHAVIORAL HEALTH - SUMMARY
57 yr old female with hx of two prior psych admissions to [facility] .  Pt with hx of dxn of bipolar disorder. Hx of noncompliance with medication or treatment .  Pt was reported to have attempted to fashion a noose with the intent of hanging herself , and was also attempting to "drag her  outside in the middle of the blizzard."  Pt was noted to have thought disorder, preoccupation with own thoughts , impaired reasoning and impaired judgement.
57 yr old female with hx of two prior psych admissions to [facility] .  Pt with hx of dxn of bipolar disorder. Hx of noncompliance with medication or treatment .  Pt was reported to have attempted to fashion a noose with the intent of hanging herself , and was also attempting to "drag her  outside in the middle of the blizzard."  Pt was noted to have thought disorder, preoccupation with own thoughts , impaired reasoning and impaired judgement.
57 yr old female with hx of two prior psych admissions to [facility] .  Pt with hx of dxn of bipolar disorder. Hx of noncompliance with medication or treatment .  Pt was reported to have attempted to fashion a noose with the intent of hanging herself , and was also attempting to "drag her  outside in the middle of the blizzard."  Pt was noted to have thought disorder, preoccupation with own thoughts , impaired reasoning and impaired judgement.
57 yr old female with hx of two prior psych admissions to [facility] .  Pt with hx of dxn of bipolar disorder. Hx of noncompliance with medication or treatment .  Pt was reported to have attempted to fashion a noose with the intent of hanging herself , and was also attempting to "drag her  outside in the middle of the blizzard."  Pt was noted to have thought disorder, preoccupation with own thoughts , impaired reasoning and impaired judgement.
57 yr old female with hx of two prior psych admissions to [facility] .  Pt with hx of dxn of bipolar disorder. Hx of noncompliance with medication or treatment .  Pt was reported to have attempted to fashion a noose with the intent of hanging herself , and was also attempting to "drag her  outside in the middle of the blizzard."  Pt was noted to have thought disorder, preoccupation with own thoughts , impaired reasoning and impaired judgement.
57 yr old female with hx of two prior psych admissions to [facility] .  Pt with hx of dxn of bipolar disorder. Hx of noncompliance with medication or treatment .  Pt was reported to have attempted to fashion a noose with the intent of hanging herself , and was also attempting to "drag her  outside in the middle of the blizzard."  Pt was noted to have thought disorder, preoccupation with own thoughts , impaired reasoning and impaired judgement.
57 yr old female with hx of two prior psych admissions to [facility] .  Pt with hx of dxn of bipolar disorder. Hx of noncompliance with medication or treatment .  Pt was reported to have attempted to fashion a noose with the intent of hanging herself , and was also attempting to "drag her  outside in the middle of the blizzard."  Pt was noted to have thought disorder, preoccupation with own thoughts , impaired reasoning and impaired judgement.
57 yr old female with hx of two prior psych admissions to [facility] .  Pt with hx of dxn of bipolar disorder. Hx of noncompliance with medication or treatment .  Pt was reported to have attempted to fashion a noose with the intent of hanging herself , and was also attempting to "drag her  outside in the middle of the blizzard."  Pt was noted to have thought disorder, preoccupation with own thoughts , impaired reasoning and impaired judgement.

## 2018-01-24 NOTE — PROGRESS NOTE BEHAVIORAL HEALTH - NSBHADMITIPBHPROVFT_PSY_A_CORE
see the initial assessment
see the initial assessment
see assessment
see the initial assessment

## 2018-01-24 NOTE — PROGRESS NOTE BEHAVIORAL HEALTH - ESTIMATED DISCHARGE DATE
30-Jan-2018

## 2018-01-24 NOTE — PROGRESS NOTE BEHAVIORAL HEALTH - GAIT / STATION
Normal gait / station

## 2018-01-24 NOTE — PROGRESS NOTE BEHAVIORAL HEALTH - NSBHADMITIPOBS_PSY_A_CORE
Enhanced supervision
Routine observation
Routine observation
Enhanced supervision
Enhanced supervision
Routine observation
Enhanced supervision
Routine observation

## 2018-01-24 NOTE — PROGRESS NOTE BEHAVIORAL HEALTH - THOUGHT CONTENT
Preoccupations

## 2018-01-24 NOTE — PROGRESS NOTE BEHAVIORAL HEALTH - EYE CONTACT
Good/Fair
Good/Fair
Fair/Good
Good/Fair
Fair/Good
Fair/Good
Good/Fair
Fair/Good
Good/Fair
Good/Fair

## 2018-01-24 NOTE — PROGRESS NOTE BEHAVIORAL HEALTH - NS ED BHA MED ROS PSYCHIATRIC
See HPI

## 2018-01-24 NOTE — PROGRESS NOTE BEHAVIORAL HEALTH - NS ED BHA REVIEW OF ED CHART AVAILABLE INVESTIGATIONS REVIEWED
None available
Yes
None available
None available
Yes
None available

## 2018-01-24 NOTE — PROGRESS NOTE BEHAVIORAL HEALTH - PRIMARY DX
Bipolar disorder

## 2018-01-24 NOTE — PROGRESS NOTE BEHAVIORAL HEALTH - NS ED BHA MED ROS CONSTITUTIONAL SYMPTOMS
No complaints

## 2018-01-24 NOTE — PROGRESS NOTE BEHAVIORAL HEALTH - SECONDARY DX2
Noncompliance with medication regimen
Noncompliance with medication regimen
Suicide attempt by self-inflicted strangulation or suffocation
Noncompliance with medication regimen
Suicide attempt by self-inflicted strangulation or suffocation
Noncompliance with medication regimen
Suicide attempt by self-inflicted strangulation or suffocation
Noncompliance with medication regimen
Noncompliance with medication regimen

## 2018-01-24 NOTE — PROGRESS NOTE BEHAVIORAL HEALTH - BEHAVIOR
Cooperative
Cooperative
Uncooperative
Cooperative
Cooperative
Uncooperative
Cooperative
Uncooperative
Cooperative

## 2018-01-24 NOTE — PROGRESS NOTE BEHAVIORAL HEALTH - NSBHADMITIPOBSFT_PSY_A_CORE
pt with continued paranoid and grandiose delusions
pt reportedly with suicide ideation and attempt
pt with continued paranoid and grandiose delusions
pt with manic episode and reportedly with suicidal ideation
pt with preoccupation with own thoughts
pt with continued paranoid and grandiose delusions
pt with continued paranoid and grandiose delusions
pt with preoccupation with own thoughts
pt reportedly with suicide ideation and attempt
pt with continued paranoid and grandiose delusions
pt reportedly with suicide ideation and attempt
pt with preoccupation with own thoughts

## 2018-01-24 NOTE — PROGRESS NOTE BEHAVIORAL HEALTH - ESTIMATED INTELLIGENCE
Average

## 2018-01-24 NOTE — PROGRESS NOTE BEHAVIORAL HEALTH - RISK ASSESSMENT
1. chronic mental illness, psychosis  2. suicide thoughts and attempt  3. noncompliance with medication and treatment
support of family and friends  pt with work hx   pt with past hx of good level of function
1. chronic mental illness, psychosis  2. suicide thoughts and attempt  3. noncompliance with medication and treatment

## 2018-01-24 NOTE — PROGRESS NOTE BEHAVIORAL HEALTH - NSBHFUPTYPE_PSY_A_CORE
Inpatient

## 2018-01-24 NOTE — PROGRESS NOTE BEHAVIORAL HEALTH - NSBHADMITIPDSM_PSY_A_CORE
see above for Axis I, II, III

## 2018-01-24 NOTE — PROGRESS NOTE BEHAVIORAL HEALTH - NSBHFUPINTERVALCCFT_PSY_A_CORE
"I don't want another medication"
"I don't want to go outside"
"I get very thirsty and some blurry vision"
"I would like to get back to my life"
"I'm just fine , and my  and I are all good"
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  Pt with increased paranoid delusions about the  and was accusatory ,and at one moment was being extremely dramatic as she was screaming and yelling that she was being abused by the  and was accentuating the moment by attempting to slide down the chair to the floor. Pt then attempted to bargain to stay no more than a week in the hospital to continue with the adjustment of the medications. Pt with unrealistic expectations, illogical thoughts and impaired reasoning .
"I really would like to go home soon"
"I'm doing just fine"
"I really would like to get back to my life."
"I don't mind being here but do I have to stay here?
"Yes you can talk to my "
"I really don't want to stay here too long"
"I don't mind being here but do I have to stay here?

## 2018-01-24 NOTE — PROGRESS NOTE BEHAVIORAL HEALTH - NS ED BHA MSE GENERAL APPEARANCE
Well developed

## 2018-01-24 NOTE — PROGRESS NOTE BEHAVIORAL HEALTH - NSBHLEGALSTATUS_PSY_A_CORE
9.13 (Voluntary)
9.39 (Emergency)
9.13 (Voluntary)
9.27 (2PC)
9.39 (Emergency)
9.13 (Voluntary)
9.13 (Voluntary)
9.39 (Emergency)
9.39 (Emergency)
9.13 (Voluntary)
9.39 (Emergency)
9.39 (Emergency)

## 2018-01-24 NOTE — PROGRESS NOTE BEHAVIORAL HEALTH - NSBHCONSORIP_PSY_A_CORE
Inpatient Admission...

## 2018-01-25 VITALS
OXYGEN SATURATION: 100 % | DIASTOLIC BLOOD PRESSURE: 74 MMHG | HEART RATE: 55 BPM | TEMPERATURE: 98 F | RESPIRATION RATE: 12 BRPM | SYSTOLIC BLOOD PRESSURE: 111 MMHG

## 2018-01-25 RX ORDER — RISPERIDONE 4 MG/1
1 TABLET ORAL
Qty: 15 | Refills: 1 | OUTPATIENT
Start: 2018-01-25 | End: 2018-02-23

## 2018-01-25 RX ORDER — BENZTROPINE MESYLATE 1 MG
1 TABLET ORAL
Qty: 45 | Refills: 1 | OUTPATIENT
Start: 2018-01-25 | End: 2018-02-23

## 2018-01-25 RX ADMIN — Medication 1 MILLIGRAM(S): at 09:02

## 2018-01-25 RX ADMIN — Medication 1 MILLIGRAM(S): at 13:12

## 2018-01-25 RX ADMIN — RISPERIDONE 4 MILLIGRAM(S): 4 TABLET ORAL at 09:02

## 2018-01-25 NOTE — DISCHARGE NOTE BEHAVIORAL HEALTH - CONDITIONS AT DISCHARGE
Patient is alert, oriented x4, pleasant and cooperative.  Nurse reviewed discharge/care plan with patient who agreed with plan.  Patient provided all specific elements of discharge information.

## 2018-01-25 NOTE — DISCHARGE NOTE BEHAVIORAL HEALTH - NSBHDCHANDOFFFT_PSY_A_CORE
information is faxed Patient information faxed and reviewed by clinic director Patient information faxed and reviewed by clinic director at [facility]

## 2018-01-25 NOTE — DISCHARGE NOTE BEHAVIORAL HEALTH - MEDICATION SUMMARY - MEDICATIONS TO TAKE
I will START or STAY ON the medications listed below when I get home from the hospital:    benztropine 1 mg oral tablet  -- 1 tab(s) by mouth 3 times a day  -- Indication: For EPS prophylaxis    risperiDONE 4 mg oral tablet  -- 1 tab(s) by mouth once a day (at bedtime)   -- Indication: For BIPOLAR DISORDER    risperiDONE 1 mg oral tablet  -- 1 tab(s) by mouth once a day (at bedtime)  -- Indication: For BIPOLAR DISORDER

## 2018-01-25 NOTE — DISCHARGE NOTE BEHAVIORAL HEALTH - NSBHDCRESPONSEFT_PSY_A_CORE
pt refused to be on any other medication aside from  Risperdal and had a list of medications including lithium, depakote, trileptal , tegretal

## 2018-01-25 NOTE — DISCHARGE NOTE BEHAVIORAL HEALTH - NSBHDCSUICPROTECTFT_PSY_A_CORE
1. pt with support of family and friends  2. pt with professional support system  3.pt is intelligent, has future plans of returning to employment  4. Pt is resourceful and is extremely verbal and interactive.

## 2018-01-25 NOTE — DISCHARGE NOTE BEHAVIORAL HEALTH - NSTOBACCOREFERRAL_GEN_A_CS
Patient is a non smoker/Patient declined information Patient declined information/Non smoker Non Smoker/Patient declined information

## 2018-01-25 NOTE — DISCHARGE NOTE BEHAVIORAL HEALTH - NSBHDCSUICFCTRSFT_PSY_A_CORE
1. delusional thoughts are decreased with the use of risperdal 5mg po daily.  Pt is instructed to continue with the medication until directed by her   MD to discontinue with the medications

## 2018-01-25 NOTE — DISCHARGE NOTE BEHAVIORAL HEALTH - NSBHDCMEDICALFT_PSY_A_CORE
denies denies    from [place] chol 120,[id number], HDL 42 HgA1c 4.8  Pt is directed to continue with follow up with her medical internist for routine annual

## 2018-01-25 NOTE — DISCHARGE NOTE BEHAVIORAL HEALTH - NSBHDCMEDSFT_PSY_A_CORE
MEDICATIONS  (STANDING):  benztropine 1 milliGRAM(s) Oral three times a day  risperiDONE   Tablet 1 milliGRAM(s) Oral at bedtime  risperiDONE   Tablet 4 milliGRAM(s) Oral daily  risperdal consta 25mg IM q2 weeks 1 st dose on 1/17/2018    Pt is directed to continue with her medication until directed by her MD to change or to discontinue with the medications MEDICATIONS  (STANDING):  benztropine 1 milliGRAM(s) Oral three times a day  risperiDONE   Tablet 1 milliGRAM(s) Oral at bedtime  risperiDONE   Tablet 4 milliGRAM(s) Oral daily      pt refused to be on any other medication aside from  Risperdal and had a list of medications including lithium, depakote, trileptal , tegretal, abilify , seroquel , zyprexa that she refused to be on.  Pt with partial response with the risperdasl and continued [facility]   risperdal consta 25mg IM q2 weeks 1 st dose on 1/17/2018    Pt is directed to continue with her medication until directed by her MD to change or to discontinue with the medications

## 2018-01-25 NOTE — DISCHARGE NOTE BEHAVIORAL HEALTH - NSBHDCCONDITIONFT_PSY_A_CORE
pt is moderately improved as the pt denies any hallucinations, denies any suicidal ideations, . Pt still with tangential circumstantial speech. Pt verbalized willing to continue with aftercare treatment

## 2018-01-25 NOTE — DISCHARGE NOTE BEHAVIORAL HEALTH - NSBHDCDXVALIDYESFT_PSY_A_CORE
pt with thought flooding, pressured speech, labile intense expansive affect, poor sleep and high impulsivity and impaired resoning.

## 2018-01-25 NOTE — DISCHARGE NOTE BEHAVIORAL HEALTH - NSBHDCADDR1FT_A_CORE
[address]  [place] [address] [address]  [place] [address]  ADDISON AngelW [address]  [place] [address]  Appointment with Denise Vega LCSW  11:30am for intake appointment  Miguel Funk LCSW [address]  [place] [address]  Appointment with SHELDON Blake  11:15am for intake appointment

## 2018-01-25 NOTE — DISCHARGE NOTE BEHAVIORAL HEALTH - NSBHDCSUICSAFETYFT_PSY_A_CORE
1. pt with the appt for aftercare  2.pt is aware if the symptoms increase that she can return to the ER at [facility] for reevaluation  3 in the interim that the pt may contact Dr Muñoz at [facility]

## 2018-01-25 NOTE — DISCHARGE NOTE BEHAVIORAL HEALTH - HPI (INCLUDE ILLNESS QUALITY, SEVERITY, DURATION, TIMING, CONTEXT, MODIFYING FACTORS, ASSOCIATED SIGNS AND SYMPTOMS)
57 yr old female , according to the  had attempted to construct a noose in an attempt to hang herself.  He called police and on there arrival she attempted to run away. When she arrived at [facility] according to the referral material she was continuing to be screaming  was  "illogical, incoherent, organized and was uncooperative." On exam she was found to be " disheveled, limited eye contact, disorganized ,illogical with flight of idea, affect constricted, positive for suicidal ideation , denies homicidal ideation , positive delusions, memory and attention limited , insight and judgement poor. "      Pt with good eye contact. Alert Pt with overinclusive speech. She is denying any suicide ideation or plan and completely denies any suicide attempt " oh no my cat and horse would never forgive me, why would I be trying to kill myself , I'm not sick . ." Pt then went off on a tangent "my animals ,  uses my animals as a weapon. He tried to prevent me from having contact with my animals as punishment.  Actually , he shouldn't have contact with my horse. it isn't ethical .  Come to think of it he shouldn't have contact with my cat either. That's what it boils down to, isn't it.... what about ethics .  What happens when two people grow apart as to how much interaction." Pt with loosening of association, tangential speech, illogical thoughts and impaired reasoning. No children. denies any drug use, Claims the  " he misunderstood what was happening ,and I was playing with the reins as I have a horse, and he probably misunderstood what was happening, I guess he means well , but he's not well"

## 2018-01-25 NOTE — DISCHARGE NOTE BEHAVIORAL HEALTH - NSBHDCSUICFCTRMIT_PSY_A_CORE
1. both the pt and family aware that the pt is being d/c today and of the aftercare txt  2. pt with appt for aftercare

## 2018-01-25 NOTE — DISCHARGE NOTE BEHAVIORAL HEALTH - NSBHDCCRISISPLAN1FT_PSY_A_CORE
Call 911, go to the nearest emergency room, call my MD or Therapist, Call the crisis intervention number provided

## 2018-01-30 DIAGNOSIS — F31.9 BIPOLAR DISORDER, UNSPECIFIED: ICD-10-CM

## 2018-01-30 DIAGNOSIS — Z88.8 ALLERGY STATUS TO OTHER DRUGS, MEDICAMENTS AND BIOLOGICAL SUBSTANCES STATUS: ICD-10-CM

## 2018-01-30 DIAGNOSIS — L73.8 OTHER SPECIFIED FOLLICULAR DISORDERS: ICD-10-CM

## 2018-01-30 DIAGNOSIS — L85.3 XEROSIS CUTIS: ICD-10-CM
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
## 2018-01-30 DIAGNOSIS — Z91.14 PATIENT'S OTHER NONCOMPLIANCE WITH MEDICATION REGIMEN: ICD-10-CM
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
## 2018-05-12 ENCOUNTER — OUTPATIENT (OUTPATIENT)
Dept: OUTPATIENT SERVICES | Facility: HOSPITAL | Age: 58
LOS: 1 days | End: 2018-05-12

## 2018-05-12 DIAGNOSIS — M54.81 OCCIPITAL NEURALGIA: ICD-10-CM

## 2019-03-05 NOTE — BEHAVIORAL HEALTH ASSESSMENT NOTE - AFFECT QUALITY

## 2025-05-07 NOTE — PATIENT PROFILE BEHAVIORAL HEALTH - NSBHABLCOPE_PSY_A_CORE
Writer called Nav[facility] to apologize and offer an alternate appointment (for tomorrow) as she had made a scheduling error and needs to cancel today's appointment.    low